# Patient Record
(demographics unavailable — no encounter records)

---

## 2025-05-12 NOTE — PHYSICAL EXAM
[JVD] : no jugular venous distention  [Normal Breath Sounds] : Normal breath sounds [Normal Rate and Rhythm] : normal rate and rhythm [No Rash or Lesion] : No rash or lesion [Alert] : alert [Calm] : calm [de-identified] : soft, NT/ND, small umbilical hernia defect

## 2025-05-12 NOTE — ASSESSMENT
[FreeTextEntry1] : Patient presents today with a primary umbilical hernia that is less than 1 cm. We discussed options for management which include observation for hernias, including surgical repair for hernias that are symptomatic and/or incarcerated. Since she only had a single episode of discomfort and would like to have additional pregnancies I am recommending watchful waiting.   Instructed to use warm compress if pain presents.  If she has symptoms of incarceration should present to the ED.  Follow up as needed

## 2025-05-12 NOTE — HISTORY OF PRESENT ILLNESS
[de-identified] : 32 yo F 6 weeks postpartum presenting with umbilical discomfort and concern for umbilical hernia.  States that immediately following pregnancy she noticed some tenderness around her umbilicus but did not think much of it due to the associated postpartum recovery.  This past Thursday she had increasing umbilical pain that resolved with a heating pad.  She denies a bulge in the umbilical area.  No associated N/V or change in bowel habits.